# Patient Record
Sex: FEMALE | Race: ASIAN | NOT HISPANIC OR LATINO | Employment: STUDENT | URBAN - METROPOLITAN AREA
[De-identification: names, ages, dates, MRNs, and addresses within clinical notes are randomized per-mention and may not be internally consistent; named-entity substitution may affect disease eponyms.]

---
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
## 2022-08-02 ENCOUNTER — LAB REQUISITION (OUTPATIENT)
Dept: LAB | Facility: CLINIC | Age: 22
End: 2022-08-02

## 2022-08-02 DIAGNOSIS — Z11.8 ENCOUNTER FOR SCREENING FOR OTHER INFECTIOUS AND PARASITIC DISEASES: ICD-10-CM

## 2022-08-02 PROCEDURE — 87491 CHLMYD TRACH DNA AMP PROBE: CPT | Performed by: PHYSICIAN ASSISTANT

## 2022-08-03 LAB
C TRACH DNA SPEC QL PROBE+SIG AMP: NEGATIVE
N GONORRHOEA DNA SPEC QL NAA+PROBE: NEGATIVE

## 2024-03-05 ENCOUNTER — LAB REQUISITION (OUTPATIENT)
Dept: LAB | Facility: CLINIC | Age: 24
End: 2024-03-05

## 2024-03-05 DIAGNOSIS — Z13.1 ENCOUNTER FOR SCREENING FOR DIABETES MELLITUS: ICD-10-CM

## 2024-03-05 DIAGNOSIS — Z12.4 ENCOUNTER FOR SCREENING FOR MALIGNANT NEOPLASM OF CERVIX: ICD-10-CM

## 2024-03-05 DIAGNOSIS — Z13.6 ENCOUNTER FOR SCREENING FOR CARDIOVASCULAR DISORDERS: ICD-10-CM

## 2024-03-05 PROCEDURE — 80048 BASIC METABOLIC PNL TOTAL CA: CPT | Performed by: STUDENT IN AN ORGANIZED HEALTH CARE EDUCATION/TRAINING PROGRAM

## 2024-03-05 PROCEDURE — 80061 LIPID PANEL: CPT | Performed by: STUDENT IN AN ORGANIZED HEALTH CARE EDUCATION/TRAINING PROGRAM

## 2024-03-05 PROCEDURE — G0145 SCR C/V CYTO,THINLAYER,RESCR: HCPCS | Performed by: STUDENT IN AN ORGANIZED HEALTH CARE EDUCATION/TRAINING PROGRAM

## 2024-03-07 LAB
ANION GAP SERPL CALCULATED.3IONS-SCNC: 12 MMOL/L (ref 7–15)
BUN SERPL-MCNC: 14.4 MG/DL (ref 6–20)
CALCIUM SERPL-MCNC: 9.5 MG/DL (ref 8.6–10)
CHLORIDE SERPL-SCNC: 100 MMOL/L (ref 98–107)
CHOLEST SERPL-MCNC: 171 MG/DL
CREAT SERPL-MCNC: 0.67 MG/DL (ref 0.51–0.95)
DEPRECATED HCO3 PLAS-SCNC: 25 MMOL/L (ref 22–29)
EGFRCR SERPLBLD CKD-EPI 2021: >90 ML/MIN/1.73M2
FASTING STATUS PATIENT QL REPORTED: NORMAL
GLUCOSE SERPL-MCNC: 81 MG/DL (ref 70–99)
HDLC SERPL-MCNC: 66 MG/DL
LDLC SERPL CALC-MCNC: 97 MG/DL
NONHDLC SERPL-MCNC: 105 MG/DL
POTASSIUM SERPL-SCNC: 4.3 MMOL/L (ref 3.4–5.3)
SODIUM SERPL-SCNC: 137 MMOL/L (ref 135–145)
TRIGL SERPL-MCNC: 41 MG/DL

## 2024-03-08 LAB
BKR LAB AP GYN ADEQUACY: NORMAL
BKR LAB AP GYN INTERPRETATION: NORMAL
BKR LAB AP HPV REFLEX: NO
BKR LAB AP PREVIOUS ABNORMAL: NORMAL
PATH REPORT.COMMENTS IMP SPEC: NORMAL
PATH REPORT.COMMENTS IMP SPEC: NORMAL
PATH REPORT.RELEVANT HX SPEC: NORMAL

## 2024-05-24 ENCOUNTER — HOSPITAL ENCOUNTER (OUTPATIENT)
Facility: CLINIC | Age: 24
Setting detail: OBSERVATION
Discharge: HOME | End: 2024-05-24
Attending: EMERGENCY MEDICINE | Admitting: INTERNAL MEDICINE
Payer: COMMERCIAL

## 2024-05-24 ENCOUNTER — APPOINTMENT (OUTPATIENT)
Dept: GENERAL RADIOLOGY | Facility: CLINIC | Age: 24
End: 2024-05-24
Attending: EMERGENCY MEDICINE
Payer: COMMERCIAL

## 2024-05-24 VITALS
SYSTOLIC BLOOD PRESSURE: 111 MMHG | DIASTOLIC BLOOD PRESSURE: 62 MMHG | WEIGHT: 134.26 LBS | TEMPERATURE: 97.8 F | HEIGHT: 61 IN | HEART RATE: 106 BPM | BODY MASS INDEX: 25.35 KG/M2 | OXYGEN SATURATION: 95 % | RESPIRATION RATE: 18 BRPM

## 2024-05-24 DIAGNOSIS — J45.901 REACTIVE AIRWAY DISEASE WITH ACUTE EXACERBATION, UNSPECIFIED ASTHMA SEVERITY, UNSPECIFIED WHETHER PERSISTENT: ICD-10-CM

## 2024-05-24 PROBLEM — J96.00 ACUTE RESPIRATORY FAILURE, UNSPECIFIED WHETHER WITH HYPOXIA OR HYPERCAPNIA (H): Status: ACTIVE | Noted: 2024-05-24

## 2024-05-24 LAB
ANION GAP SERPL CALCULATED.3IONS-SCNC: 11 MMOL/L (ref 7–15)
ATRIAL RATE - MUSE: 108 BPM
BASE EXCESS BLDV CALC-SCNC: -2.2 MMOL/L (ref -3–3)
BASOPHILS # BLD AUTO: 0 10E3/UL (ref 0–0.2)
BASOPHILS NFR BLD AUTO: 0 %
BUN SERPL-MCNC: 15.3 MG/DL (ref 6–20)
CALCIUM SERPL-MCNC: 9.1 MG/DL (ref 8.6–10)
CHLORIDE SERPL-SCNC: 104 MMOL/L (ref 98–107)
CREAT SERPL-MCNC: 0.73 MG/DL (ref 0.51–0.95)
D DIMER PPP FEU-MCNC: 0.49 UG/ML FEU (ref 0–0.5)
DEPRECATED HCO3 PLAS-SCNC: 26 MMOL/L (ref 22–29)
DIASTOLIC BLOOD PRESSURE - MUSE: NORMAL MMHG
EGFRCR SERPLBLD CKD-EPI 2021: >90 ML/MIN/1.73M2
EOSINOPHIL # BLD AUTO: 0.4 10E3/UL (ref 0–0.7)
EOSINOPHIL NFR BLD AUTO: 5 %
ERYTHROCYTE [DISTWIDTH] IN BLOOD BY AUTOMATED COUNT: 12.5 % (ref 10–15)
FLUAV RNA SPEC QL NAA+PROBE: NEGATIVE
FLUBV RNA RESP QL NAA+PROBE: NEGATIVE
GLUCOSE SERPL-MCNC: 144 MG/DL (ref 70–99)
HCG SERPL QL: NEGATIVE
HCO3 BLDV-SCNC: 25 MMOL/L (ref 21–28)
HCT VFR BLD AUTO: 45.1 % (ref 35–47)
HGB BLD-MCNC: 14.7 G/DL (ref 11.7–15.7)
IMM GRANULOCYTES # BLD: 0 10E3/UL
IMM GRANULOCYTES NFR BLD: 0 %
INTERPRETATION ECG - MUSE: NORMAL
LYMPHOCYTES # BLD AUTO: 2.5 10E3/UL (ref 0.8–5.3)
LYMPHOCYTES NFR BLD AUTO: 28 %
MCH RBC QN AUTO: 31.9 PG (ref 26.5–33)
MCHC RBC AUTO-ENTMCNC: 32.6 G/DL (ref 31.5–36.5)
MCV RBC AUTO: 98 FL (ref 78–100)
MONOCYTES # BLD AUTO: 0.6 10E3/UL (ref 0–1.3)
MONOCYTES NFR BLD AUTO: 7 %
NEUTROPHILS # BLD AUTO: 5.5 10E3/UL (ref 1.6–8.3)
NEUTROPHILS NFR BLD AUTO: 60 %
NRBC # BLD AUTO: 0 10E3/UL
NRBC BLD AUTO-RTO: 0 /100
O2/TOTAL GAS SETTING VFR VENT: 21 %
OXYHGB MFR BLDV: 40 % (ref 70–75)
P AXIS - MUSE: 77 DEGREES
PCO2 BLDV: 52 MM HG (ref 40–50)
PH BLDV: 7.29 [PH] (ref 7.32–7.43)
PLATELET # BLD AUTO: 215 10E3/UL (ref 150–450)
PO2 BLDV: 27 MM HG (ref 25–47)
POTASSIUM SERPL-SCNC: 3.9 MMOL/L (ref 3.4–5.3)
PR INTERVAL - MUSE: 154 MS
QRS DURATION - MUSE: 76 MS
QT - MUSE: 318 MS
QTC - MUSE: 426 MS
R AXIS - MUSE: 84 DEGREES
RBC # BLD AUTO: 4.61 10E6/UL (ref 3.8–5.2)
RSV RNA SPEC NAA+PROBE: NEGATIVE
SAO2 % BLDV: 40 % (ref 70–75)
SARS-COV-2 RNA RESP QL NAA+PROBE: NEGATIVE
SODIUM SERPL-SCNC: 141 MMOL/L (ref 135–145)
SYSTOLIC BLOOD PRESSURE - MUSE: NORMAL MMHG
T AXIS - MUSE: 16 DEGREES
TROPONIN T SERPL HS-MCNC: <6 NG/L
VENTRICULAR RATE- MUSE: 108 BPM
WBC # BLD AUTO: 9.1 10E3/UL (ref 4–11)

## 2024-05-24 PROCEDURE — 84703 CHORIONIC GONADOTROPIN ASSAY: CPT | Performed by: EMERGENCY MEDICINE

## 2024-05-24 PROCEDURE — 999N000157 HC STATISTIC RCP TIME EA 10 MIN

## 2024-05-24 PROCEDURE — 85025 COMPLETE CBC W/AUTO DIFF WBC: CPT | Performed by: EMERGENCY MEDICINE

## 2024-05-24 PROCEDURE — 250N000009 HC RX 250: Performed by: EMERGENCY MEDICINE

## 2024-05-24 PROCEDURE — 96376 TX/PRO/DX INJ SAME DRUG ADON: CPT

## 2024-05-24 PROCEDURE — 94660 CPAP INITIATION&MGMT: CPT

## 2024-05-24 PROCEDURE — 71045 X-RAY EXAM CHEST 1 VIEW: CPT

## 2024-05-24 PROCEDURE — 99291 CRITICAL CARE FIRST HOUR: CPT | Mod: 25

## 2024-05-24 PROCEDURE — 80048 BASIC METABOLIC PNL TOTAL CA: CPT | Performed by: EMERGENCY MEDICINE

## 2024-05-24 PROCEDURE — 96375 TX/PRO/DX INJ NEW DRUG ADDON: CPT

## 2024-05-24 PROCEDURE — 85379 FIBRIN DEGRADATION QUANT: CPT | Performed by: EMERGENCY MEDICINE

## 2024-05-24 PROCEDURE — 87637 SARSCOV2&INF A&B&RSV AMP PRB: CPT | Performed by: EMERGENCY MEDICINE

## 2024-05-24 PROCEDURE — 999N000156 HC STATISTIC RCP CONSULT EA 30 MIN

## 2024-05-24 PROCEDURE — 93005 ELECTROCARDIOGRAM TRACING: CPT

## 2024-05-24 PROCEDURE — 250N000011 HC RX IP 250 OP 636: Performed by: HOSPITALIST

## 2024-05-24 PROCEDURE — 94640 AIRWAY INHALATION TREATMENT: CPT

## 2024-05-24 PROCEDURE — 99207 PR APP CREDIT; MD BILLING SHARED VISIT: CPT | Performed by: INTERNAL MEDICINE

## 2024-05-24 PROCEDURE — 99223 1ST HOSP IP/OBS HIGH 75: CPT | Performed by: HOSPITALIST

## 2024-05-24 PROCEDURE — 84484 ASSAY OF TROPONIN QUANT: CPT | Performed by: EMERGENCY MEDICINE

## 2024-05-24 PROCEDURE — 96365 THER/PROPH/DIAG IV INF INIT: CPT

## 2024-05-24 PROCEDURE — 250N000009 HC RX 250: Performed by: HOSPITALIST

## 2024-05-24 PROCEDURE — G0378 HOSPITAL OBSERVATION PER HR: HCPCS

## 2024-05-24 PROCEDURE — 36415 COLL VENOUS BLD VENIPUNCTURE: CPT | Performed by: EMERGENCY MEDICINE

## 2024-05-24 PROCEDURE — 99418 PROLNG IP/OBS E/M EA 15 MIN: CPT | Performed by: HOSPITALIST

## 2024-05-24 PROCEDURE — 82805 BLOOD GASES W/O2 SATURATION: CPT | Performed by: EMERGENCY MEDICINE

## 2024-05-24 PROCEDURE — 250N000011 HC RX IP 250 OP 636: Performed by: EMERGENCY MEDICINE

## 2024-05-24 RX ORDER — CETIRIZINE HYDROCHLORIDE 10 MG/1
10 TABLET ORAL DAILY
COMMUNITY

## 2024-05-24 RX ORDER — ALBUTEROL SULFATE 0.83 MG/ML
2.5 SOLUTION RESPIRATORY (INHALATION) ONCE
Status: DISCONTINUED | OUTPATIENT
Start: 2024-05-24 | End: 2024-05-24

## 2024-05-24 RX ORDER — METHYLPREDNISOLONE SODIUM SUCCINATE 40 MG/ML
40 INJECTION, POWDER, LYOPHILIZED, FOR SOLUTION INTRAMUSCULAR; INTRAVENOUS EVERY 8 HOURS
Status: DISCONTINUED | OUTPATIENT
Start: 2024-05-24 | End: 2024-05-24 | Stop reason: HOSPADM

## 2024-05-24 RX ORDER — POLYETHYLENE GLYCOL 3350 17 G/17G
17 POWDER, FOR SOLUTION ORAL DAILY PRN
Status: DISCONTINUED | OUTPATIENT
Start: 2024-05-24 | End: 2024-05-24 | Stop reason: HOSPADM

## 2024-05-24 RX ORDER — METHYLPREDNISOLONE SODIUM SUCCINATE 125 MG/2ML
125 INJECTION, POWDER, LYOPHILIZED, FOR SOLUTION INTRAMUSCULAR; INTRAVENOUS ONCE
Status: COMPLETED | OUTPATIENT
Start: 2024-05-24 | End: 2024-05-24

## 2024-05-24 RX ORDER — PREDNISONE 20 MG/1
TABLET ORAL
Qty: 12 TABLET | Refills: 0 | Status: SHIPPED | OUTPATIENT
Start: 2024-05-24

## 2024-05-24 RX ORDER — AMOXICILLIN 250 MG
2 CAPSULE ORAL 2 TIMES DAILY PRN
Status: DISCONTINUED | OUTPATIENT
Start: 2024-05-24 | End: 2024-05-24 | Stop reason: HOSPADM

## 2024-05-24 RX ORDER — MAGNESIUM SULFATE HEPTAHYDRATE 40 MG/ML
2 INJECTION, SOLUTION INTRAVENOUS ONCE
Status: COMPLETED | OUTPATIENT
Start: 2024-05-24 | End: 2024-05-24

## 2024-05-24 RX ORDER — ASPIRIN 325 MG
1 TABLET ORAL DAILY
COMMUNITY

## 2024-05-24 RX ORDER — CARBOXYMETHYLCELLULOSE SODIUM 5 MG/ML
1 SOLUTION/ DROPS OPHTHALMIC
Status: DISCONTINUED | OUTPATIENT
Start: 2024-05-24 | End: 2024-05-24 | Stop reason: HOSPADM

## 2024-05-24 RX ORDER — DEXTROAMPHETAMINE SACCHARATE, AMPHETAMINE ASPARTATE, DEXTROAMPHETAMINE SULFATE AND AMPHETAMINE SULFATE 2.5; 2.5; 2.5; 2.5 MG/1; MG/1; MG/1; MG/1
10 TABLET ORAL DAILY PRN
COMMUNITY
Start: 2024-03-12

## 2024-05-24 RX ORDER — IPRATROPIUM BROMIDE AND ALBUTEROL SULFATE 2.5; .5 MG/3ML; MG/3ML
3 SOLUTION RESPIRATORY (INHALATION)
Status: COMPLETED | OUTPATIENT
Start: 2024-05-24 | End: 2024-05-24

## 2024-05-24 RX ORDER — SERTRALINE HYDROCHLORIDE 25 MG/1
25 TABLET, FILM COATED ORAL DAILY
COMMUNITY

## 2024-05-24 RX ORDER — DEXTROSE MONOHYDRATE 25 G/50ML
25-50 INJECTION, SOLUTION INTRAVENOUS
Status: DISCONTINUED | OUTPATIENT
Start: 2024-05-24 | End: 2024-05-24 | Stop reason: HOSPADM

## 2024-05-24 RX ORDER — ONDANSETRON 2 MG/ML
4 INJECTION INTRAMUSCULAR; INTRAVENOUS EVERY 6 HOURS PRN
Status: DISCONTINUED | OUTPATIENT
Start: 2024-05-24 | End: 2024-05-24 | Stop reason: HOSPADM

## 2024-05-24 RX ORDER — ONDANSETRON 4 MG/1
4 TABLET, ORALLY DISINTEGRATING ORAL EVERY 6 HOURS PRN
Status: DISCONTINUED | OUTPATIENT
Start: 2024-05-24 | End: 2024-05-24 | Stop reason: HOSPADM

## 2024-05-24 RX ORDER — NICOTINE POLACRILEX 4 MG
15-30 LOZENGE BUCCAL
Status: DISCONTINUED | OUTPATIENT
Start: 2024-05-24 | End: 2024-05-24 | Stop reason: HOSPADM

## 2024-05-24 RX ORDER — BISACODYL 10 MG
10 SUPPOSITORY, RECTAL RECTAL DAILY PRN
Status: DISCONTINUED | OUTPATIENT
Start: 2024-05-24 | End: 2024-05-24 | Stop reason: HOSPADM

## 2024-05-24 RX ORDER — AMOXICILLIN 250 MG
1 CAPSULE ORAL 2 TIMES DAILY PRN
Status: DISCONTINUED | OUTPATIENT
Start: 2024-05-24 | End: 2024-05-24 | Stop reason: HOSPADM

## 2024-05-24 RX ORDER — ONDANSETRON 2 MG/ML
4 INJECTION INTRAMUSCULAR; INTRAVENOUS ONCE
Status: COMPLETED | OUTPATIENT
Start: 2024-05-24 | End: 2024-05-24

## 2024-05-24 RX ORDER — IPRATROPIUM BROMIDE AND ALBUTEROL SULFATE 2.5; .5 MG/3ML; MG/3ML
3 SOLUTION RESPIRATORY (INHALATION)
Status: DISCONTINUED | OUTPATIENT
Start: 2024-05-24 | End: 2024-05-24 | Stop reason: HOSPADM

## 2024-05-24 RX ORDER — LEVALBUTEROL TARTRATE 45 UG/1
2 AEROSOL, METERED ORAL EVERY 4 HOURS PRN
Qty: 15 G | Refills: 2 | Status: SHIPPED | OUTPATIENT
Start: 2024-05-24

## 2024-05-24 RX ORDER — IPRATROPIUM BROMIDE AND ALBUTEROL SULFATE 2.5; .5 MG/3ML; MG/3ML
3 SOLUTION RESPIRATORY (INHALATION) EVERY 4 HOURS PRN
Status: DISCONTINUED | OUTPATIENT
Start: 2024-05-24 | End: 2024-05-24 | Stop reason: HOSPADM

## 2024-05-24 RX ORDER — ALBUTEROL SULFATE 0.83 MG/ML
2.5 SOLUTION RESPIRATORY (INHALATION)
Status: DISCONTINUED | OUTPATIENT
Start: 2024-05-24 | End: 2024-05-24 | Stop reason: HOSPADM

## 2024-05-24 RX ADMIN — METHYLPREDNISOLONE SODIUM SUCCINATE 40 MG: 40 INJECTION, POWDER, FOR SOLUTION INTRAMUSCULAR; INTRAVENOUS at 13:49

## 2024-05-24 RX ADMIN — IPRATROPIUM BROMIDE AND ALBUTEROL SULFATE 3 ML: .5; 3 SOLUTION RESPIRATORY (INHALATION) at 11:49

## 2024-05-24 RX ADMIN — METHYLPREDNISOLONE SODIUM SUCCINATE 125 MG: 125 INJECTION, POWDER, FOR SOLUTION INTRAMUSCULAR; INTRAVENOUS at 05:53

## 2024-05-24 RX ADMIN — IPRATROPIUM BROMIDE AND ALBUTEROL SULFATE 3 ML: .5; 3 SOLUTION RESPIRATORY (INHALATION) at 05:04

## 2024-05-24 RX ADMIN — IPRATROPIUM BROMIDE AND ALBUTEROL SULFATE 3 ML: .5; 3 SOLUTION RESPIRATORY (INHALATION) at 05:13

## 2024-05-24 RX ADMIN — ONDANSETRON 4 MG: 2 INJECTION INTRAMUSCULAR; INTRAVENOUS at 05:50

## 2024-05-24 RX ADMIN — IPRATROPIUM BROMIDE AND ALBUTEROL SULFATE 3 ML: .5; 3 SOLUTION RESPIRATORY (INHALATION) at 05:16

## 2024-05-24 RX ADMIN — MAGNESIUM SULFATE HEPTAHYDRATE 2 G: 2 INJECTION, SOLUTION INTRAVENOUS at 06:22

## 2024-05-24 RX ADMIN — IPRATROPIUM BROMIDE AND ALBUTEROL SULFATE 3 ML: .5; 3 SOLUTION RESPIRATORY (INHALATION) at 07:01

## 2024-05-24 ASSESSMENT — ACTIVITIES OF DAILY LIVING (ADL)
ADLS_ACUITY_SCORE: 35
ADLS_ACUITY_SCORE: 35
ADLS_ACUITY_SCORE: 18
ADLS_ACUITY_SCORE: 18
ADLS_ACUITY_SCORE: 35
ADLS_ACUITY_SCORE: 18
ADLS_ACUITY_SCORE: 18
ADLS_ACUITY_SCORE: 35
ADLS_ACUITY_SCORE: 33
ADLS_ACUITY_SCORE: 18
ADLS_ACUITY_SCORE: 18

## 2024-05-24 ASSESSMENT — COLUMBIA-SUICIDE SEVERITY RATING SCALE - C-SSRS
2. HAVE YOU ACTUALLY HAD ANY THOUGHTS OF KILLING YOURSELF IN THE PAST MONTH?: NO
1. IN THE PAST MONTH, HAVE YOU WISHED YOU WERE DEAD OR WISHED YOU COULD GO TO SLEEP AND NOT WAKE UP?: NO
6. HAVE YOU EVER DONE ANYTHING, STARTED TO DO ANYTHING, OR PREPARED TO DO ANYTHING TO END YOUR LIFE?: NO

## 2024-05-24 NOTE — ED PROVIDER NOTES
"  Emergency Department Note      History of Present Illness     Chief Complaint  Shortness of Breath    HPI  Mireya Lira is a 24 year old female who presents to the ED for shortness of breath. Around 3590-8408 last night, the patient began to experience some shortness of breath with audible wheezing. She notes that she did exercise yesterday, as well as smoked marijuana. She does not vape. Patient endorses a slight cough over the past few days with slight chest wall pain with coughing though no active chest pain at bedside. No fever, sore throat, abdominal pain, diarrhea. She does note 2 episodes post-tussive emesis. She has not been around anyone who is sick. No recent antibiotic use. No history asthma or underlying lung disorders.  She has an Nexaplanon implant. Mother notes reactive airways as a child.    Independent Historian  None    Review of External Notes  1/26/24 ED visit  Past Medical History   Medical History and Problem List  No other significant past medical history or family history.    Medications  Cetirizine HCl    Physical Exam   Patient Vitals for the past 24 hrs:   BP Temp Temp src Pulse Resp SpO2 Height Weight   05/24/24 0450 116/72 98.6  F (37  C) Oral 111 20 97 % 1.537 m (5' 0.5\") 61.3 kg (135 lb 2.3 oz)     Physical Exam  Nursing note and vitals reviewed.  Constitutional: Well nourished.   Eyes: Conjunctiva normal.  Pupils are equal, round, and reactive to light.   ENT: Nose normal. Mucous membranes pink and moist.  TM normal. No posterior oropharyngeal erythema, no exudate. Uvula midline.   Neck: Normal range of motion.  CVS: Sinus tachycardia.  Normal heart sounds.    Pulmonary: Lungs with diffuse wheezing bilaterally  GI: Abdomen soft. Nontender, nondistended. No rigidity or guarding.    MSK: No calf tenderness or swelling.  Neuro: Alert. Follows simple commands.  Skin: Skin is warm and dry. No rash noted.   Psychiatric: Normal affect.     Diagnostics   Lab Results   Labs Ordered and " Resulted from Time of ED Arrival to Time of ED Departure   BASIC METABOLIC PANEL - Abnormal       Result Value    Sodium 141      Potassium 3.9      Chloride 104      Carbon Dioxide (CO2) 26      Anion Gap 11      Urea Nitrogen 15.3      Creatinine 0.73      GFR Estimate >90      Calcium 9.1      Glucose 144 (*)    BLOOD GAS VENOUS - Abnormal    pH Venous 7.29 (*)     pCO2 Venous 52 (*)     pO2 Venous 27      Bicarbonate Venous 25      Base Excess/Deficit Venous -2.2      FIO2 21      Oxyhemoglobin Venous 40 (*)     O2 Sat, Venous 40.0 (*)    D DIMER QUANTITATIVE - Normal    D-Dimer Quantitative 0.49     TROPONIN T, HIGH SENSITIVITY - Normal    Troponin T, High Sensitivity <6     INFLUENZA A/B, RSV, & SARS-COV2 PCR - Normal    Influenza A PCR Negative      Influenza B PCR Negative      RSV PCR Negative      SARS CoV2 PCR Negative     HCG QUALITATIVE PREGNANCY - Normal    hCG Serum Qualitative Negative     CBC WITH PLATELETS AND DIFFERENTIAL    WBC Count 9.1      RBC Count 4.61      Hemoglobin 14.7      Hematocrit 45.1      MCV 98      MCH 31.9      MCHC 32.6      RDW 12.5      Platelet Count 215      % Neutrophils 60      % Lymphocytes 28      % Monocytes 7      % Eosinophils 5      % Basophils 0      % Immature Granulocytes 0      NRBCs per 100 WBC 0      Absolute Neutrophils 5.5      Absolute Lymphocytes 2.5      Absolute Monocytes 0.6      Absolute Eosinophils 0.4      Absolute Basophils 0.0      Absolute Immature Granulocytes 0.0      Absolute NRBCs 0.0     GLUCOSE MONITOR NURSING POCT   BLOOD GAS ARTERIAL       Imaging  XR Chest Port 1 View   Final Result   IMPRESSION: Normal heart size. Clear lungs. No pneumothorax or pleural effusion. 4 cm linear opacity projecting over the right upper arm likely reflects implantable contraceptive device.          ECG results from 05/24/24   EKG 12-lead, tracing only     Value    Systolic Blood Pressure     Diastolic Blood Pressure     Ventricular Rate 108    Atrial Rate 108     MT Interval 154    QRS Duration 76        QTc 426    P Axis 77    R AXIS 84    T Axis 16    Interpretation ECG      Sinus tachycardia  Nonspecific T wave abnormality  Abnormal ECG  No previous ECGs available         Independent Interpretation  CXR: No pneumothorax, infiltrate, pleural effusion, or mediastinal widening.  ED Course    Medications Administered  Medications   ipratropium - albuterol 0.5 mg/2.5 mg/3 mL (DUONEB) neb solution 3 mL (has no administration in time range)       Procedures  Procedures     Discussion of Management  None    Social Determinants of Health adding to complexity of care  None    ED Course  ED Course as of 05/24/24 0649   Fri May 24, 2024   0619 Patient reports still having persistent difficulty breathing though nebulizers helped   0623 Initiated on BiPAP   0646 Spoke to hospitalist Dr. Kendell Florian who states ok to discontinue BiPAP at this time       Medical Decision Making / Diagnosis   CMS Diagnoses: None    MIPS     None    MDM  Mireya Lira is a 24 year old female presenting with cough, dyspnea and wheezing.  She is mildly tachycardic on arrival though otherwise nontoxic.  She has diffuse wheezing on exam.  She was given nebulizer treatments x 3 as well as a dose of IV Solu-Medrol.  She continued to have persistent wheezing and was given IV magnesium as well.  Mother reports child had RAD and I do have concerns that presentation is more consistent with reactive airway disease.  EKG without ischemic changes.  Screening troponin negative.  No active chest pain reported.  D-dimer negative, clinically doubt PE.  She tested negative for COVID-19/influenza/RSV.  Chest x-ray without focal pneumonia, fluid overload, widened mediastinum or pneumothorax.  She remained otherwise hemodynamically stable, initiated on BiPAP during her time in the ED briefly given work of breathing though discontinued upon hospitalist evaluation.  No indication for further advanced airway support; no  hypoxia noted at bedside and patient reports much improvement. Will plan for scheduled albuterol.     Disposition  The patient was admitted to the hospital.     ICD-10 Codes:    ICD-10-CM    1. Reactive airway disease with acute exacerbation, unspecified asthma severity, unspecified whether persistent  J45.901          Critical Care time was 30 minutes for this patient excluding procedures.    Discharge Medications  New Prescriptions    No medications on file     Scribe Disclosure:  Hemal BENNETT, am serving as a scribe at 5:10 AM on 5/24/2024 to document services personally performed by Aissatou Rodriguez DO based on my observations and the provider's statements to me.      iAssatou Rodriguez DO  05/24/24 0690

## 2024-05-24 NOTE — PROGRESS NOTES
A BiPAP of  10/5 @ 21% was applied to the pt via the mask for an increase in WOB and/or SOB.  The bridge of the nose looks good and remains intact. Pt is tolerating it well. Will continue to monitor and assess the pt's current respiratory status and needs.    Adriana Foley, RT on 5/24/2024 at 6:40 AM

## 2024-05-24 NOTE — PHARMACY-ADMISSION MEDICATION HISTORY
Pharmacist Admission Medication History    Admission medication history is complete. The information provided in this note is only as accurate as the sources available at the time of the update.    Information Source(s): Patient and CareEverywhere/SureScripts via in-person    Pertinent Information: none    Changes made to PTA medication list:  Added: All PTA meds were added  Deleted: None  Changed: None    Medication History Completed By:   Alysia FloresD, [facility]   Emergency Medicine Pharmacist  [phone] or Crescencio  May 24, 2024    PTA Med List   Medication Sig Last Dose    amphetamine-dextroamphetamine (ADDERALL) 10 MG tablet Take 10 mg by mouth daily as needed (attention/focus) Past Week at -    cetirizine (ZYRTEC) 10 MG tablet Take 10 mg by mouth daily Past Week at -    Multiple Vitamins-Minerals (MULTIVITAMIN GUMMIES ADULT) CHEW Take 1 chew tab by mouth daily Past Week at -    sertraline (ZOLOFT) 25 MG tablet Take 25 mg by mouth daily 5/22/2024 at -

## 2024-05-24 NOTE — PLAN OF CARE
"PRIMARY DIAGNOSIS: \"SOB\" NURSING  OUTPATIENT/OBSERVATION GOALS TO BE MET BEFORE DISCHARGE:  ADLs back to baseline: Yes    Activity and level of assistance: Ambulating independently.    Pain status: Pain free.    Return to near baseline physical activity: Yes     Discharge Planner Nurse   Safe discharge environment identified: Yes  Barriers to discharge: Yes       Entered by: Elana Collazo RN 05/24/2024 9:41 AM     A/o x4. IV saline locked. Reg diet. Breathing feels improved per patient. Independent in room. Possible discharge this afternoon.       Please review provider order for any additional goals.   Nurse to notify provider when observation goals have been met and patient is ready for discharge.      Problem: Adult Inpatient Plan of Care  Goal: Plan of Care Review  Description: The Plan of Care Review/Shift note should be completed every shift.  The Outcome Evaluation is a brief statement about your assessment that the patient is improving, declining, or no change.  This information will be displayed automatically on your shift  note.  Outcome: Progressing  Flowsheets (Taken 5/24/2024 0996)  Outcome Evaluation: breathing improved. Possible discharge this afternoon  Plan of Care Reviewed With: patient  Overall Patient Progress: improving  Goal: Patient-Specific Goal (Individualized)  Description: You can add care plan individualizations to a care plan. Examples of Individualization might be:  \"Parent requests to be called daily at 9am for status\", \"I have a hard time hearing out of my right ear\", or \"Do not touch me to wake me up as it startles  me\".  Outcome: Progressing  Goal: Absence of Hospital-Acquired Illness or Injury  Outcome: Progressing  Intervention: Identify and Manage Fall Risk  Recent Flowsheet Documentation  Taken 5/24/2024 0853 by Elana Collazo RN  Safety Promotion/Fall Prevention:   assistive device/personal items within reach   clutter free environment maintained   nonskid shoes/slippers " when out of bed   safety round/check completed  Intervention: Prevent Skin Injury  Recent Flowsheet Documentation  Taken 5/24/2024 0811 by Elana Collazo RN  Body Position: position changed independently  Intervention: Prevent Infection  Recent Flowsheet Documentation  Taken 5/24/2024 0811 by Elana Collazo RN  Infection Prevention: rest/sleep promoted  Goal: Optimal Comfort and Wellbeing  Outcome: Progressing  Goal: Readiness for Transition of Care  Outcome: Progressing  Intervention: Mutually Develop Transition Plan  Recent Flowsheet Documentation  Taken 5/24/2024 0935 by Elana Collazo RN  Equipment Currently Used at Home: none     Problem: Gas Exchange Impaired  Goal: Optimal Gas Exchange  Outcome: Progressing  Intervention: Optimize Oxygenation and Ventilation  Recent Flowsheet Documentation  Taken 5/24/2024 0811 by Elana Collazo RN  Head of Bed (HOB) Positioning: HOB at 15 degrees       Goal Outcome Evaluation:      Plan of Care Reviewed With: patient    Overall Patient Progress: improvingOverall Patient Progress: improving    Outcome Evaluation: breathing improved. Possible discharge this afternoon

## 2024-05-24 NOTE — ED NOTES
"[facility]  ED Nurse Handoff Report    ED Chief complaint: Shortness of Breath  . ED Diagnosis:   Final diagnoses:   Reactive airway disease with acute exacerbation, unspecified asthma severity, unspecified whether persistent       Allergies:   Allergies   Allergen Reactions    Seasonal Allergies        Code Status: Full Code    Activity level - Baseline/Home:  independent.  Activity Level - Current:   independent.   Lift room needed: No.   Bariatric: No   Needed: No   Isolation: No.   Infection: Not Applicable.     Respiratory status: Room air    Vital Signs (within 30 minutes):   Vitals:    05/24/24 0610 05/24/24 0626 05/24/24 0630 05/24/24 0631   BP:       Pulse:  111     Resp: 28 14     Temp:       TempSrc:       SpO2: 95% 99% 98% 100%   Weight:       Height:           Cardiac Rhythm:  ,      Pain level:    Patient confused: No.   Patient Falls Risk: assistive device/personal items within reach.   Elimination Status: Unable to void yet    Patient Report - Initial Complaint: Shortness of breath.   Focused Assessment: Per MD note: \"Mireya Lira is a 24 year old female who presents to the ED for shortness of breath. Around 5959-6231 last night, the patient began to experience some shortness of breath with audible wheezing. She notes that she did exercise yesterday, as well as smoked marijuana. She does not vape. Patient endorses a slight cough over the past few days with slight chest wall pain with coughing though no active chest pain at bedside. No fever, sore throat, abdominal pain, diarrhea. She does note 2 episodes post-tussive emesis. She has not been around anyone who is sick. No recent antibiotic use. No history asthma or underlying lung disorders.  She has an Nexaplanon implant. Mother notes reactive airways as a child. \"    Patient is alert and oriented, able to make needs known.    Abnormal Results:   Labs Ordered and Resulted from Time of ED Arrival to Time of ED " Departure   BASIC METABOLIC PANEL - Abnormal       Result Value    Sodium 141      Potassium 3.9      Chloride 104      Carbon Dioxide (CO2) 26      Anion Gap 11      Urea Nitrogen 15.3      Creatinine 0.73      GFR Estimate >90      Calcium 9.1      Glucose 144 (*)    BLOOD GAS VENOUS - Abnormal    pH Venous 7.29 (*)     pCO2 Venous 52 (*)     pO2 Venous 27      Bicarbonate Venous 25      Base Excess/Deficit Venous -2.2      FIO2 21      Oxyhemoglobin Venous 40 (*)     O2 Sat, Venous 40.0 (*)    D DIMER QUANTITATIVE - Normal    D-Dimer Quantitative 0.49     TROPONIN T, HIGH SENSITIVITY - Normal    Troponin T, High Sensitivity <6     INFLUENZA A/B, RSV, & SARS-COV2 PCR - Normal    Influenza A PCR Negative      Influenza B PCR Negative      RSV PCR Negative      SARS CoV2 PCR Negative     HCG QUALITATIVE PREGNANCY - Normal    hCG Serum Qualitative Negative     CBC WITH PLATELETS AND DIFFERENTIAL    WBC Count 9.1      RBC Count 4.61      Hemoglobin 14.7      Hematocrit 45.1      MCV 98      MCH 31.9      MCHC 32.6      RDW 12.5      Platelet Count 215      % Neutrophils 60      % Lymphocytes 28      % Monocytes 7      % Eosinophils 5      % Basophils 0      % Immature Granulocytes 0      NRBCs per 100 WBC 0      Absolute Neutrophils 5.5      Absolute Lymphocytes 2.5      Absolute Monocytes 0.6      Absolute Eosinophils 0.4      Absolute Basophils 0.0      Absolute Immature Granulocytes 0.0      Absolute NRBCs 0.0     GLUCOSE MONITOR NURSING POCT   BLOOD GAS ARTERIAL        XR Chest Port 1 View   Final Result   IMPRESSION: Normal heart size. Clear lungs. No pneumothorax or pleural effusion. 4 cm linear opacity projecting over the right upper arm likely reflects implantable contraceptive device.          Treatments provided: See MAR  Family Comments: Mother at bedside  OBS brochure/video discussed/provided to patient:  N/A  ED Medications:   Medications   magnesium sulfate 2 g in 50 mL sterile water intermittent  infusion (2 g Intravenous $New Bag 5/24/24 0622)   glucose gel 15-30 g (has no administration in time range)     Or   dextrose 50 % injection 25-50 mL (has no administration in time range)     Or   glucagon injection 1 mg (has no administration in time range)   ondansetron (ZOFRAN ODT) ODT tab 4 mg (has no administration in time range)     Or   ondansetron (ZOFRAN) injection 4 mg (has no administration in time range)   senna-docusate (SENOKOT-S/PERICOLACE) 8.6-50 MG per tablet 1 tablet (has no administration in time range)     Or   senna-docusate (SENOKOT-S/PERICOLACE) 8.6-50 MG per tablet 2 tablet (has no administration in time range)   bisacodyl (DULCOLAX) suppository 10 mg (has no administration in time range)   polyethylene glycol (MIRALAX) Packet 17 g (has no administration in time range)   No lozenges or gum should be given while patient on BIPAP/AVAPS/AVAPS AE (has no administration in time range)   carboxymethylcellulose PF (REFRESH PLUS) 0.5 % ophthalmic solution 1 drop (has no administration in time range)   Patient may continue current oral medications (has no administration in time range)   methylPREDNISolone sodium succinate (SOLU-MEDROL) injection 40 mg (has no administration in time range)   ipratropium - albuterol 0.5 mg/2.5 mg/3 mL (DUONEB) neb solution 3 mL (has no administration in time range)   ipratropium - albuterol 0.5 mg/2.5 mg/3 mL (DUONEB) neb solution 3 mL (has no administration in time range)   ipratropium - albuterol 0.5 mg/2.5 mg/3 mL (DUONEB) neb solution 3 mL (3 mLs Nebulization $Given 5/24/24 0516)   methylPREDNISolone sodium succinate (solu-MEDROL) injection 125 mg (125 mg Intravenous $Given 5/24/24 0553)   ondansetron (ZOFRAN) injection 4 mg (4 mg Intravenous $Given 5/24/24 0550)       Drips infusing:  Yes  For the majority of the shift this patient was Green.   Interventions performed were NA.    Sepsis treatment initiated: No    Cares/treatment/interventions/medications to be  completed following ED care: [facility]    ED Nurse Name: Jocelyne Kilgore RN  6:57 AM

## 2024-05-24 NOTE — PROVIDER NOTIFICATION
"         [facility]  Respiratory Care Note     05/24/24 1224   RCAT Assessment   Reason for Assessment Asthma   Pulmonary Status 3   Surgical Status 0   Chest X-ray 0   Respiratory Pattern 0   Mental Status 0   Breath Sounds 4   Cough Effectiveness 0   Level of Activity 0   O2 Required for SpO2>=92% 0   Acuity Level (points) 7   Acuity Level  4   Re-eval Interval Guideline Every 3 days   Re-evaluation Date 05/27/24   Clinical Indications/Symptoms   Aerosol Therapy Physician order;RCAT protocol   Aerosol Therapy Plan   RT Treatment Nebulizer   Anticholinergic/Beta-Andrenergic Agonist Duoneb soln (0.5mg/3mg per 3mL) neb Max 6 doses/24h   Aerosol Treatment Frequency Acuity Level 3: QID/PRN @noc-Mod wheezing/Hx asthma/secretion removal  (scores prn but will keep QID)   Broncho-Pulmonary Hygiene Plan   Broncho-Pulmonary Hygiene Treatment Coughing techniques   Broncho-Pulm Hygiene Frequency Acuity Level 3: TID-Small amounts secretions/poor cough, hx of secretions     Vital signs:  Temp: 97.8  F (36.6  C) Temp src: Oral BP: 111/62 Pulse: 106   Resp: 18 SpO2: 95 % O2 Device: None (Room air)   Height: 153.9 cm (5' 0.6\") Weight: 60.9 kg (134 lb 4.2 oz)  Estimated body mass index is 25.7 kg/m  as calculated from the following:    Height as of this encounter: 1.539 m (5' 0.6\").    Weight as of this encounter: 60.9 kg (134 lb 4.2 oz).    Study Result    Narrative & Impression   EXAM: XR CHEST PORT 1 VIEW  LOCATION: [facility]  DATE: 5/24/2024     INDICATION: sob  COMPARISON: 5/4/2017                                                                      IMPRESSION: Normal heart size. Clear lungs. No pneumothorax or pleural effusion. 4 cm linear opacity projecting over the right upper arm likely reflects implantable contraceptive device.       Demarcus Hawk RT  [facility]  5/24/2024      "

## 2024-05-24 NOTE — H&P
"[facility]  Hospitalist H&P    Name: Mireya Lira      MRN: [id number]  YOB: 2000    Age: 24 year old  Date of admission: 5/24/2024  Primary care provider:             Assessment and Plan:     Mireya Lira is a 24 year old female with no significant medical history who presents with shortness of breath and wheezing consistent with reactive airway disease.    Problem list:  Reactive airway disease: The patient at about 7 PM last night developed acute shortness of breath and wheezing.  She reports a productive cough but denies fevers.  She denies any chest pain or palpitations.  Her mom indicates that she had some reactive airway when she was a baby and needed a few nebulizers but is not on any maintenance or rescue inhalers.  She has no history of asthma nor tobacco use.  She does have some seasonal allergies to cats.  She denies any recent allergic exposures.  Upon arrival to the ED she was in respiratory distress but not hypoxic.  She had diffuse bronchospasm and wheezing which is still present but fairly faint.  She received multiple nebulizers and is no longer in respiratory distress.  She was just now placed on BiPAP but I evaluated the patient with RT and we have now discontinued BiPAP and the patient looks to be doing quite well.  We will hold off on the continuous albuterol nebulizer.  I will place her on scheduled and as needed DuoNebs as well as Solu-Medrol 40 mg IV every 8 hours with mucolytic's and cough suppressants available as needed.  I recommend outpatient PFTs when she is clinically improved.    Clinically Significant Risk Factors Present on Admission                    # Non-Invasive mechanical ventilation: current O2 Device: BiPAP/CPAP  # Acute hypoxic respiratory failure: continue supplemental O2 as needed     # Overweight: Estimated body mass index is 25.96 kg/m  as calculated from the following:    Height as of this encounter: 1.537 m (5' 0.5\").    " Weight as of this encounter: 61.3 kg (135 lb 2.3 oz).              Code status: Full.  Admit to inpatient status.  Prophylaxis: PCD's.  Disposition: Home in 2 days.    80 MINUTES SPENT BY ME on the date of service doing chart review, history, exam, documentation & further activities per the note.          Chief Complaint:     Shortness of breath and wheezing.         History of Present Illness:   Mireya Lira is a 24 year old female who presents with shortness of breath and wheezing.  History was obtained from my discussion with the patient at the bedside.  I also discussed the case with the ED provider.  The electronic medical record was also reviewed.    The patient at about 7 PM last night developed acute shortness of breath and wheezing.  She reports a productive cough but denies fevers.  She denies any chest pain or palpitations.  Her mom indicates that she had some reactive airway when she was a baby and needed a few nebulizers but is not on any maintenance or rescue inhalers.  She has no history of asthma nor tobacco use.  She does have some seasonal allergies to cats.  She denies any recent allergic exposures.  Upon arrival to the ED she was in respiratory distress but not hypoxic.     In the ED her temperature was 98.6, heart rate 111, blood pressure 116/72, respiratory 20 and oxygen saturation 97% on room air.  BMP, troponin, CBC are normal.  Urine pregnancy test is negative.  Viral respiratory testing is negative.  EKG shows sinus tachycardia with a rate of 108 and no ischemic changes.  Chest x-ray shows clear lungs without pneumothorax or pleural effusion.            Past Medical History:   No past medical history on file.          Past Surgical History:   No past surgical history on file.          Social History:     Social History     Tobacco Use    Smoking status: Never    Smokeless tobacco: Not on file   Substance Use Topics    Alcohol use: Not on file             Family History:   The family  "history was fully reviewed and non-contributory in this case.         Allergies:     Allergies   Allergen Reactions    Seasonal Allergies              Medications:     Prior to Admission medications    Medication Sig Last Dose Taking? Auth Provider Long Term End Date   Cetirizine HCl (ZYRTEC PO) Take  by mouth.   Reported, Patient     diphenhydrAMINE (BENADRYL) 25 MG capsule Take 25 mg by mouth every 6 hours as needed.   Reported, Patient     fluticasone (FLONASE) 50 MCG/ACT nasal spray Spray 1-2 sprays into both nostrils daily.   Ivanna Pleitez PA-C               Review of Systems:     A Comprehensive greater than 10 system review of systems was carried out.  Pertinent positives and negatives are noted above.  Otherwise negative for contributory information.           Physical Exam:   Blood pressure 111/75, pulse 111, temperature 98.6  F (37  C), temperature source Oral, resp. rate 14, height 1.537 m (5' 0.5\"), weight 61.3 kg (135 lb 2.3 oz), last menstrual period 05/13/2024, SpO2 100%.  Wt Readings from Last 1 Encounters:   05/24/24 61.3 kg (135 lb 2.3 oz)     Exam:  GENERAL: No apparent distress. Awake, alert, and fully oriented.  HEENT: Normocephalic, atraumatic. Extraocular movements intact.  CARDIOVASCULAR: Regular rate and rhythm without murmurs or rubs. No S3.  PULMONARY: Faint wheezing bilaterally.  ABDOMINAL: Soft, non-tender, non-distended. Bowel sounds normoactive.   EXTREMITIES: No cyanosis or clubbing. No appreciable edema.  NEUROLOGICAL: CN 2-12 grossly intact, no focal neurological deficits.  DERMATOLOGICAL: No rash, ulcer, bruising, nor jaundice.          Data:   EKG:  Personally reviewed.     Laboratory:  Recent Labs   Lab 05/24/24  0216   WBC 9.1   HGB 14.7   HCT 45.1   MCV 98        Recent Labs   Lab 05/24/24  0216      POTASSIUM 3.9   CHLORIDE 104   CO2 26   ANIONGAP 11   *   BUN 15.3   CR 0.73   GFRESTIMATED >90   AARON 9.1     No results for input(s): \"CULT\" in the last 168 " hours.    Imaging:  Recent Results (from the past 24 hour(s))   XR Chest Port 1 View    Narrative    EXAM: XR CHEST PORT 1 VIEW  LOCATION: [facility]  DATE: 5/24/2024    INDICATION: sob  COMPARISON: 5/4/2017      Impression    IMPRESSION: Normal heart size. Clear lungs. No pneumothorax or pleural effusion. 4 cm linear opacity projecting over the right upper arm likely reflects implantable contraceptive device.       Kendell Florian DO MPH  [facility] Hospitalist  [address] [place] [address]  05/24/2024

## 2024-05-24 NOTE — ED TRIAGE NOTES
Pt complaining of SOB and wheezing. Has been coughing for the last day. Coughing clear phlegm. Denies history of asthma. Unable to sleep due to wheezing. Had 2 episodes of emesis after coughing fits. Had taken 25 mg Benadryl around 0430.   HX covid in January     Triage Assessment (Adult)       Row Name 05/24/24 0452          Triage Assessment    Airway WDL WDL        Respiratory WDL    Respiratory WDL X;rhythm/pattern;cough;expansion/retractions     Rhythm/Pattern, Respiratory shortness of breath;pattern regular     Cough Frequency infrequent     Cough Type tight;nonproductive        Skin Circulation/Temperature WDL    Skin Circulation/Temperature WDL WDL        Cardiac WDL    Cardiac WDL WDL        Peripheral/Neurovascular WDL    Peripheral Neurovascular WDL WDL        Cognitive/Neuro/Behavioral WDL    Cognitive/Neuro/Behavioral WDL WDL

## 2024-05-24 NOTE — PLAN OF CARE
"IV removed. Discharge instructions gone over and understanding verbalized. Meds (prednisone, inhaler) given to patient. All belongings gathered and sent with patient. Mom to transport home.       Problem: Adult Inpatient Plan of Care  Goal: Plan of Care Review  Description: The Plan of Care Review/Shift note should be completed every shift.  The Outcome Evaluation is a brief statement about your assessment that the patient is improving, declining, or no change.  This information will be displayed automatically on your shift  note.  5/24/2024 1549 by Elana Collazo RN  Outcome: Met  Flowsheets (Taken 5/24/2024 1549)  Outcome Evaluation: ready for discharge  Plan of Care Reviewed With: patient  Overall Patient Progress: improving  5/24/2024 1327 by Elana Collazo RN  Outcome: Progressing  Flowsheets (Taken 5/24/2024 1200)  Outcome Evaluation: breathing improved. Possible discharge this afternoon.  Plan of Care Reviewed With: patient  Overall Patient Progress: improving  5/24/2024 0940 by Elana Collazo RN  Outcome: Progressing  Flowsheets (Taken 5/24/2024 0940)  Outcome Evaluation: breathing improved. Possible discharge this afternoon  Plan of Care Reviewed With: patient  Overall Patient Progress: improving  Goal: Patient-Specific Goal (Individualized)  Description: You can add care plan individualizations to a care plan. Examples of Individualization might be:  \"Parent requests to be called daily at 9am for status\", \"I have a hard time hearing out of my right ear\", or \"Do not touch me to wake me up as it startles  me\".  5/24/2024 1549 by Elana Collazo RN  Outcome: Met  5/24/2024 1327 by Elana Collazo RN  Outcome: Progressing  5/24/2024 0940 by Elana Collazo RN  Outcome: Progressing  Goal: Absence of Hospital-Acquired Illness or Injury  5/24/2024 1549 by Elana Collazo RN  Outcome: Met  5/24/2024 1327 by Elana Collazo, RN  Outcome: Progressing  5/24/2024 0940 by Elana Collazo RN  Outcome: " Progressing  Intervention: Identify and Manage Fall Risk  Recent Flowsheet Documentation  Taken 5/24/2024 0811 by Elana Collazo RN  Safety Promotion/Fall Prevention:   assistive device/personal items within reach   clutter free environment maintained   nonskid shoes/slippers when out of bed   safety round/check completed  Intervention: Prevent Skin Injury  Recent Flowsheet Documentation  Taken 5/24/2024 0811 by Elana Collazo RN  Body Position: position changed independently  Intervention: Prevent Infection  Recent Flowsheet Documentation  Taken 5/24/2024 0811 by Elana Collazo RN  Infection Prevention: rest/sleep promoted  Goal: Optimal Comfort and Wellbeing  5/24/2024 1549 by Elana Collazo RN  Outcome: Met  5/24/2024 1327 by Elana Collazo RN  Outcome: Progressing  5/24/2024 0940 by Elana Collazo RN  Outcome: Progressing  Goal: Readiness for Transition of Care  5/24/2024 1549 by Elana Collazo RN  Outcome: Met  5/24/2024 1327 by Elana Collazo RN  Outcome: Progressing  5/24/2024 0940 by Elana Collazo RN  Outcome: Progressing  Intervention: Mutually Develop Transition Plan  Recent Flowsheet Documentation  Taken 5/24/2024 0935 by Elana Collazo RN  Equipment Currently Used at Home: none     Problem: Gas Exchange Impaired  Goal: Optimal Gas Exchange  5/24/2024 1549 by Elana Collazo RN  Outcome: Met  5/24/2024 1327 by Elana Collazo RN  Outcome: Progressing  5/24/2024 0940 by Elana Collazo RN  Outcome: Progressing  Intervention: Optimize Oxygenation and Ventilation  Recent Flowsheet Documentation  Taken 5/24/2024 0811 by Elana Collazo RN  Head of Bed (HOB) Positioning: HOB at 15 degrees       Goal Outcome Evaluation:      Plan of Care Reviewed With: patient    Overall Patient Progress: improvingOverall Patient Progress: improving    Outcome Evaluation: ready for discharge

## 2024-05-24 NOTE — PLAN OF CARE
"PRIMARY DIAGNOSIS: \"SOB\" NURSING  OUTPATIENT/OBSERVATION GOALS TO BE MET BEFORE DISCHARGE:  ADLs back to baseline: Yes    Activity and level of assistance: Ambulating independently.    Pain status: Pain free.    Return to near baseline physical activity: Yes     Discharge Planner Nurse   Safe discharge environment identified: Yes  Barriers to discharge: Yes       Entered by: Elana Collazo RN 05/24/2024      A/o x4. IV saline locked. Reg diet. Breathing feels improved per patient. Independent in room. Possible discharge this afternoon.       Please review provider order for any additional goals.   Nurse to notify provider when observation goals have been met and patient is ready for discharge.      Problem: Adult Inpatient Plan of Care  Goal: Plan of Care Review  Description: The Plan of Care Review/Shift note should be completed every shift.  The Outcome Evaluation is a brief statement about your assessment that the patient is improving, declining, or no change.  This information will be displayed automatically on your shift  note.  Outcome: Progressing  Flowsheets (Taken 5/24/2024 0940)  Outcome Evaluation: breathing improved. Possible discharge this afternoon  Plan of Care Reviewed With: patient  Overall Patient Progress: improving  Goal: Patient-Specific Goal (Individualized)  Description: You can add care plan individualizations to a care plan. Examples of Individualization might be:  \"Parent requests to be called daily at 9am for status\", \"I have a hard time hearing out of my right ear\", or \"Do not touch me to wake me up as it startles  me\".  Outcome: Progressing  Goal: Absence of Hospital-Acquired Illness or Injury  Outcome: Progressing  Intervention: Identify and Manage Fall Risk  Recent Flowsheet Documentation  Taken 5/24/2024 0811 by Elana Collazo RN  Safety Promotion/Fall Prevention:   assistive device/personal items within reach   clutter free environment maintained   nonskid shoes/slippers when out " of bed   safety round/check completed  Intervention: Prevent Skin Injury  Recent Flowsheet Documentation  Taken 5/24/2024 0811 by Elana Collazo RN  Body Position: position changed independently  Intervention: Prevent Infection  Recent Flowsheet Documentation  Taken 5/24/2024 0811 by Elana Collazo RN  Infection Prevention: rest/sleep promoted  Goal: Optimal Comfort and Wellbeing  Outcome: Progressing  Goal: Readiness for Transition of Care  Outcome: Progressing  Intervention: Mutually Develop Transition Plan  Recent Flowsheet Documentation  Taken 5/24/2024 0935 by Elana Collazo RN  Equipment Currently Used at Home: none     Problem: Gas Exchange Impaired  Goal: Optimal Gas Exchange  Outcome: Progressing  Intervention: Optimize Oxygenation and Ventilation  Recent Flowsheet Documentation  Taken 5/24/2024 0811 by Elana Collazo RN  Head of Bed (HOB) Positioning: HOB at 15 degrees       Goal Outcome Evaluation:      Plan of Care Reviewed With: patient    Overall Patient Progress: improvingOverall Patient Progress: improving    Outcome Evaluation: breathing improved. Possible discharge this afternoon.

## 2024-05-24 NOTE — DISCHARGE SUMMARY
"[facility]  Hospitalist Discharge Summary      Date of Admission:  5/24/2024  Date of Discharge:  5/24/2024  Discharging Provider: Tito Drake MD  Discharge Service: Hospitalist Service    Discharge Diagnoses     Acute hypoxic respiratory failure  Acute bronchospasm  Consider underlying asthma    Clinically Significant Risk Factors     # Overweight: Estimated body mass index is 25.7 kg/m  as calculated from the following:    Height as of this encounter: 1.539 m (5' 0.6\").    Weight as of this encounter: 60.9 kg (134 lb 4.2 oz).       Follow-ups Needed After Discharge   Follow-up Appointments     Follow-up and recommended labs and tests       Follow up with primary care provider, , within 7-14 days for hospital   follow- up.  The following labs/tests are recommended: pulmonary function   tests once at baseline.            Unresulted Labs Ordered in the Past 30 Days of this Admission       No orders found from 4/24/2024 to 5/25/2024.            Discharge Disposition   Discharged to home  Condition at discharge: Stable    Hospital Course     Mireya Lira is a 24 year old female with no significant medical history who presented with shortness of breath and wheezing consistent with reactive airway disease. She was tachycardic and in distress on exam. She was placed on BIPAP and given nebulized bronchodilators and IV solumedrol with improvement. She was admitted for monitoring. She improved much more quickly than anticipated. She is off oxygen now and no longer wheezing. Diet will be advanced and she will be mobilized on the medical floor. If improvement continues she will discharge home later today on a prednisone taper and with a xopenex inhaler (per insurance formulary). She will follow up with primary care in 1-2 weeks. I am recommending outpatient PFTs once breathing is back to baseline    Consultations This Hospital Stay   None    Code Status   Full Code    Time Spent on this " Encounter   I, Tito Drake MD, personally saw the patient today and spent greater than 30 minutes discharging this patient.       Tito Drake MD  [facility] OBSERVATION DEPT  [address] [facility] [place] [address]  Phone: [phone]  ______________________________________________________________________    Physical Exam   Vital Signs: Temp: 97.8  F (36.6  C) Temp src: Oral BP: 123/74 Pulse: 97   Resp: 16 SpO2: 99 % O2 Device: None (Room air)    Weight: 134 lbs 4.16 oz  GENERAL:  Comfortable. Cooperative.  PSYCH: pleasant, oriented, No acute distress.  EYES: PERRLA, Normal conjunctiva.  HEART:  Regular rate and rhythm. No JVD. Pulses normal. No edema.  LUNGS:  Clear to auscultation, normal Respiratory effort.  ABDOMEN:  Soft, no hepatosplenomegaly, normal bowel sounds.  EXTREMETIES: No clubbing, cyanosis or ischemia  SKIN:  Dry to touch, No rash.         Primary Care Physician       Discharge Orders      Reason for your hospital stay    Acute bronchospasm     Follow-up and recommended labs and tests     Follow up with primary care provider, , within 7-14 days for hospital follow- up.  The following labs/tests are recommended: pulmonary function tests once at baseline.     Activity    Your activity upon discharge: activity as tolerated     Diet    Follow this diet upon discharge: Regular       Significant Results and Procedures   Most Recent 3 CBC's:  Recent Labs   Lab Test 05/24/24 0216   WBC 9.1   HGB 14.7   MCV 98        Most Recent 3 BMP's:  Recent Labs   Lab Test 05/24/24  0216 03/05/24  1633    137   POTASSIUM 3.9 4.3   CHLORIDE 104 100   CO2 26 25   BUN 15.3 14.4   CR 0.73 0.67   ANIONGAP 11 12   AARON 9.1 9.5   * 81   ,   Results for orders placed or performed during the hospital encounter of 05/24/24   XR Chest Port 1 View    Narrative    EXAM: XR CHEST PORT 1 VIEW  LOCATION: [facility]  DATE: 5/24/2024    INDICATION:  sob  COMPARISON: 5/4/2017      Impression    IMPRESSION: Normal heart size. Clear lungs. No pneumothorax or pleural effusion. 4 cm linear opacity projecting over the right upper arm likely reflects implantable contraceptive device.       Discharge Medications   Current Discharge Medication List        START taking these medications    Details   levalbuterol (XOPENEX HFA) 45 MCG/ACT inhaler Inhale 2 puffs into the lungs every 4 hours as needed for shortness of breath or wheezing  Qty: 15 g, Refills: 2    Associated Diagnoses: Reactive airway disease with acute exacerbation, unspecified asthma severity, unspecified whether persistent      predniSONE (DELTASONE) 20 MG tablet By mouth, take 2 tabs daily for 4 days, then 1 tab daily for 4 days, then stop  Qty: 12 tablet, Refills: 0    Associated Diagnoses: Reactive airway disease with acute exacerbation, unspecified asthma severity, unspecified whether persistent           CONTINUE these medications which have NOT CHANGED    Details   amphetamine-dextroamphetamine (ADDERALL) 10 MG tablet Take 10 mg by mouth daily as needed (attention/focus)      cetirizine (ZYRTEC) 10 MG tablet Take 10 mg by mouth daily      Multiple Vitamins-Minerals (MULTIVITAMIN GUMMIES ADULT) CHEW Take 1 chew tab by mouth daily      sertraline (ZOLOFT) 25 MG tablet Take 25 mg by mouth daily           Allergies   Allergies   Allergen Reactions    Seasonal Allergies